# Patient Record
Sex: FEMALE | Race: WHITE | NOT HISPANIC OR LATINO | Employment: OTHER | URBAN - METROPOLITAN AREA
[De-identification: names, ages, dates, MRNs, and addresses within clinical notes are randomized per-mention and may not be internally consistent; named-entity substitution may affect disease eponyms.]

---

## 2019-02-27 ENCOUNTER — APPOINTMENT (EMERGENCY)
Dept: CT IMAGING | Facility: HOSPITAL | Age: 84
End: 2019-02-27
Payer: MEDICARE

## 2019-02-27 ENCOUNTER — HOSPITAL ENCOUNTER (EMERGENCY)
Facility: HOSPITAL | Age: 84
Discharge: HOME | End: 2019-02-27
Attending: EMERGENCY MEDICINE | Admitting: EMERGENCY MEDICINE
Payer: MEDICARE

## 2019-02-27 ENCOUNTER — APPOINTMENT (EMERGENCY)
Dept: RADIOLOGY | Facility: HOSPITAL | Age: 84
End: 2019-02-27
Payer: MEDICARE

## 2019-02-27 VITALS
TEMPERATURE: 98 F | HEART RATE: 55 BPM | RESPIRATION RATE: 14 BRPM | OXYGEN SATURATION: 98 % | DIASTOLIC BLOOD PRESSURE: 86 MMHG | SYSTOLIC BLOOD PRESSURE: 196 MMHG

## 2019-02-27 DIAGNOSIS — R19.01 ABDOMINAL MASS, RUQ (RIGHT UPPER QUADRANT): ICD-10-CM

## 2019-02-27 DIAGNOSIS — S01.01XA SCALP LACERATION, INITIAL ENCOUNTER: ICD-10-CM
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
## 2019-02-27 DIAGNOSIS — S09.90XA CLOSED HEAD INJURY, INITIAL ENCOUNTER: ICD-10-CM

## 2019-02-27 LAB
ANION GAP SERPL CALCULATED.3IONS-SCNC: 7 MMOL/L (ref 4–13)
APTT PPP: 25 SECONDS (ref 26–38)
BASOPHILS # BLD AUTO: 0.01 THOUSANDS/ΜL (ref 0–0.1)
BASOPHILS NFR BLD AUTO: 0 % (ref 0–1)
BUN SERPL-MCNC: 46 MG/DL (ref 5–25)
CALCIUM SERPL-MCNC: 8.7 MG/DL (ref 8.3–10.1)
CHLORIDE SERPL-SCNC: 100 MMOL/L (ref 100–108)
CO2 SERPL-SCNC: 32 MMOL/L (ref 21–32)
CREAT SERPL-MCNC: 2.07 MG/DL (ref 0.6–1.3)
EOSINOPHIL # BLD AUTO: 0.19 THOUSAND/ΜL (ref 0–0.61)
EOSINOPHIL NFR BLD AUTO: 4 % (ref 0–6)
ERYTHROCYTE [DISTWIDTH] IN BLOOD BY AUTOMATED COUNT: 12.4 % (ref 11.6–15.1)
GFR SERPL CREATININE-BSD FRML MDRD: 21 ML/MIN/1.73SQ M
GLUCOSE SERPL-MCNC: 111 MG/DL (ref 65–140)
HCT VFR BLD AUTO: 27.4 % (ref 34.8–46.1)
HGB BLD-MCNC: 8.9 G/DL (ref 11.5–15.4)
IMM GRANULOCYTES # BLD AUTO: 0.01 THOUSAND/UL (ref 0–0.2)
IMM GRANULOCYTES NFR BLD AUTO: 0 % (ref 0–2)
INR PPP: 0.99 (ref 0.86–1.17)
LYMPHOCYTES # BLD AUTO: 1.47 THOUSANDS/ΜL (ref 0.6–4.47)
LYMPHOCYTES NFR BLD AUTO: 28 % (ref 14–44)
MCH RBC QN AUTO: 29.9 PG (ref 26.8–34.3)
MCHC RBC AUTO-ENTMCNC: 32.5 G/DL (ref 31.4–37.4)
MCV RBC AUTO: 92 FL (ref 82–98)
MONOCYTES # BLD AUTO: 0.46 THOUSAND/ΜL (ref 0.17–1.22)
MONOCYTES NFR BLD AUTO: 9 % (ref 4–12)
NEUTROPHILS # BLD AUTO: 3.06 THOUSANDS/ΜL (ref 1.85–7.62)
NEUTS SEG NFR BLD AUTO: 59 % (ref 43–75)
NRBC BLD AUTO-RTO: 0 /100 WBCS
PLATELET # BLD AUTO: 263 THOUSANDS/UL (ref 149–390)
PMV BLD AUTO: 10.2 FL (ref 8.9–12.7)
POTASSIUM SERPL-SCNC: 3.7 MMOL/L (ref 3.5–5.3)
PROTHROMBIN TIME: 12.5 SECONDS (ref 11.8–14.2)
RBC # BLD AUTO: 2.98 MILLION/UL (ref 3.81–5.12)
SODIUM SERPL-SCNC: 139 MMOL/L (ref 136–145)
WBC # BLD AUTO: 5.2 THOUSAND/UL (ref 4.31–10.16)

## 2019-02-27 PROCEDURE — 80048 BASIC METABOLIC PNL TOTAL CA: CPT | Performed by: EMERGENCY MEDICINE

## 2019-02-27 PROCEDURE — 71260 CT THORAX DX C+: CPT

## 2019-02-27 PROCEDURE — 71045 X-RAY EXAM CHEST 1 VIEW: CPT

## 2019-02-27 PROCEDURE — 85610 PROTHROMBIN TIME: CPT | Performed by: EMERGENCY MEDICINE

## 2019-02-27 PROCEDURE — 70450 CT HEAD/BRAIN W/O DYE: CPT

## 2019-02-27 PROCEDURE — 72125 CT NECK SPINE W/O DYE: CPT

## 2019-02-27 PROCEDURE — 74177 CT ABD & PELVIS W/CONTRAST: CPT

## 2019-02-27 PROCEDURE — 99284 EMERGENCY DEPT VISIT MOD MDM: CPT

## 2019-02-27 PROCEDURE — 36415 COLL VENOUS BLD VENIPUNCTURE: CPT | Performed by: EMERGENCY MEDICINE

## 2019-02-27 PROCEDURE — 85730 THROMBOPLASTIN TIME PARTIAL: CPT | Performed by: EMERGENCY MEDICINE

## 2019-02-27 PROCEDURE — 85025 COMPLETE CBC W/AUTO DIFF WBC: CPT | Performed by: EMERGENCY MEDICINE

## 2019-02-27 RX ADMIN — IOHEXOL 90 ML: 350 INJECTION, SOLUTION INTRAVENOUS at 04:31

## 2019-02-27 NOTE — ED PROVIDER NOTES
H&P Exam - Trauma   Apprajinder Li 80 y o  female MRN: [id number]  Unit/Bed#: ED 10/ED 10-01 Encounter: [id number]    Assessment/Plan   Trauma Alert: Trauma Acuity: B  Model of Arrival: Trauma Mode of Arrival: ALS via Trauma Squad Name and Number: keon  Trauma Team: Current Providers  Attending Provider: Michelle Trejo DO  Registered Nurse: Lizzie Beck, RN  Registered Nurse: Preethi Vo RN  Consultants: None    Trauma Active Problems:  Fall with head trauma and pain everywhere, dementia    Trauma Plan:  Labs, imaging, wounds treatment, res    Chief Complaint:   Chief Complaint   Patient presents with    Fall     fall found near bed, +head trauma with 1 in lac to left side of head, unknown LOC, +thinners ASA  pt comes from a dementia unit and is confused  History of Present Illness   HPI:  Carson Santoyo is a 80 y o  female who presents with unwitnessed fall with scalp laceration  Mechanism:Details of Incident: fall unknown LOC, found next to bed, +thinners, +head trauma with a 1in lac to left side of head bleeding controlled at this time  Injury Date: 02/27/19        Medics state they were told this 14-year-old demented female had an unwitnessed fall today  She was found responsive  She told medics she has pain everywhere  She has a laceration of the scalp with no other visible trauma  She is moving all her extremities        Review of Systems   Unable to perform ROS: Dementia       Historical Information     Immunizations: There is no immunization history on file for this patient  Past Medical History:   Diagnosis Date    Dehydration     Dementia     Depression     Hypertension     UTI (urinary tract infection)      History reviewed  No pertinent family history  History reviewed  No pertinent surgical history      Social History     Socioeconomic History    Marital status: Single     Spouse name: None    Number of children: None    Years of education: None    Highest education level: None   Occupational History    None   Social Needs    Financial resource strain: None    Food insecurity:     Worry: None     Inability: None    Transportation needs:     Medical: None     Non-medical: None   Tobacco Use    Smoking status: Never Smoker   Substance and Sexual Activity    Alcohol use: Never     Frequency: Never    Drug use: Never    Sexual activity: None   Lifestyle    Physical activity:     Days per week: None     Minutes per session: None    Stress: None   Relationships    Social connections:     Talks on phone: None     Gets together: None     Attends [employer] service: None     Active member of club or organization: None     Attends meetings of clubs or organizations: None     Relationship status: None    Intimate partner violence:     Fear of current or ex partner: None     Emotionally abused: None     Physically abused: None     Forced sexual activity: None   Other Topics Concern    None   Social History Narrative    None       Family History: non-contributory    Meds/Allergies   None       No Known Allergies    PHYSICAL EXAM    PE limited by:  Dementia    Objective   Vitals:   First set: Temperature: 98 3 °F (36 8 °C) (02/27/19 0345)  Pulse: 72 (02/27/19 0345)  Respirations: 18 (02/27/19 0345)  Blood Pressure: (!) 173/83 (02/27/19 0345)  SpO2: 96 % (02/27/19 0345)    Primary Survey:   (A) Airway:  Normal vocalizations  (B) Breathing:  Symmetric air entry and chest rise  (C) Circulation: Pulses:   normal  (D) Disabliity:  GCS Total:  14  (E) Expose:  Completed    Secondary Survey: (Click on Physical Exam tab above)  Physical Exam   Constitutional: She appears well-developed and well-nourished  No distress  HENT:   Head: Normocephalic  Right Ear: External ear normal    Left Ear: External ear normal    Nose: Nose normal    Mouth/Throat: Oropharynx is clear and moist    One centimeter superficial laceration of left frontal area  No foreign body noted    Bleeding controlled  Eyes: Pupils are equal, round, and reactive to light  Conjunctivae and EOM are normal    Neck: Normal range of motion  Neck supple  No JVD present  Cardiovascular: Normal rate, regular rhythm and intact distal pulses  Murmur heard  Pulmonary/Chest: Effort normal and breath sounds normal  She exhibits no tenderness  Abdominal: Soft  Bowel sounds are normal  She exhibits no distension  There is no tenderness  Musculoskeletal: Normal range of motion  She exhibits no edema, tenderness or deformity  Neurological: She is alert  She has normal reflexes  No cranial nerve deficit  She exhibits normal muscle tone  Coordination normal    Skin: Skin is warm and dry  Capillary refill takes less than 2 seconds  No rash noted  She is not diaphoretic  Psychiatric: She has a normal mood and affect  Nursing note and vitals reviewed        Invasive Devices     Peripheral Intravenous Line            Peripheral IV 02/27/19 Right Arm less than 1 day                Lab Results:   Results Reviewed     Procedure Component Value Units Date/Time    Protime-INR [[id number]]  (Normal) Collected:  02/27/19 0357    Lab Status:  Final result Specimen:  Blood from Arm, Right Updated:  02/27/19 0424     Protime 12 5 seconds      INR 0 99    APTT [[id number]]  (Abnormal) Collected:  02/27/19 0357    Lab Status:  Final result Specimen:  Blood from Arm, Right Updated:  02/27/19 0424     PTT 25 seconds     Basic metabolic panel [[id number]]  (Abnormal) Collected:  02/27/19 0357    Lab Status:  Final result Specimen:  Blood from Arm, Right Updated:  02/27/19 0418     Sodium 139 mmol/L      Potassium 3 7 mmol/L      Chloride 100 mmol/L      CO2 32 mmol/L      ANION GAP 7 mmol/L      BUN 46 mg/dL      Creatinine 2 07 mg/dL      Glucose 111 mg/dL      Calcium 8 7 mg/dL      eGFR 21 ml/min/1 73sq m     Narrative:       National Kidney Disease Education Program recommendations are as follows:  GFR calculation is accurate only with a steady state creatinine  Chronic Kidney disease less than 60 ml/min/1 73 sq  meters  Kidney failure less than 15 ml/min/1 73 sq  meters  CBC and differential [[id number]]  (Abnormal) Collected:  02/27/19 0357    Lab Status:  Final result Specimen:  Blood from Arm, Right Updated:  02/27/19 0407     WBC 5 20 Thousand/uL      RBC 2 98 Million/uL      Hemoglobin 8 9 g/dL      Hematocrit 27 4 %      MCV 92 fL      MCH 29 9 pg      MCHC 32 5 g/dL      RDW 12 4 %      MPV 10 2 fL      Platelets 697 Thousands/uL      nRBC 0 /100 WBCs      Neutrophils Relative 59 %      Immat GRANS % 0 %      Lymphocytes Relative 28 %      Monocytes Relative 9 %      Eosinophils Relative 4 %      Basophils Relative 0 %      Neutrophils Absolute 3 06 Thousands/µL      Immature Grans Absolute 0 01 Thousand/uL      Lymphocytes Absolute 1 47 Thousands/µL      Monocytes Absolute 0 46 Thousand/µL      Eosinophils Absolute 0 19 Thousand/µL      Basophils Absolute 0 01 Thousands/µL                  Imaging Studies:   CT chest abdomen pelvis w contrast   Final Result by Nicole Garcia DO (02/27 8598)      No evidence of acute visceral or vascular injury in the chest, abdomen, or pelvis  Heterogeneous masslike lesion in the right upper quadrant measures approximately 2 9 x 3 9 x 5 2 cm in size, nonspecific but possibly arising from the right adrenal gland  Pheochromocytoma is a consideration  Primary or secondary neoplasm is the    diagnosis of exclusion  Correlation with the patient's symptoms and laboratory values recommended  Additional nonemergent imaging such as MR of the abdomen with and without contrast and/or PET/CT should be considered  Prominence of the pulmonary arteries noted, could be seen with pulmonary arterial hypertension  Correlation with the patient's symptoms recommended  Cardiomegaly, bilateral renal cysts, and other findings as above        Findings discussed with Dr Len Nichole by Dr Timothy Sinha at 5:19 AM on 2/27/2019  Workstation performed: [id number]         CT head without contrast   Final Result by Twan Han DO (02/27 1979)      Large left frontoparietal scalp hematoma but no calvarial fracture or acute intracranial process is seen  Other findings as above  Workstation performed: [id number]         CT cervical spine without contrast   Final Result by Twan Han DO (02/27 8705)      Degenerative changes as described but no evidence of acute cervical spine injury  Other findings as above  Workstation performed: [id number]         XR chest 1 view portable    (Results Pending)       Other Studies:  None    Code Status: No Order  Advance Directive and Living Will:      Power of :    POLST:      Procedures  Lac Repair  Date/Time: 2/27/2019 7:06 AM  Performed by: Ulysses Reiter, DO  Authorized by: Ulysses Reiter, DO   Consent: Verbal consent obtained  Risks and benefits: risks, benefits and alternatives were discussed  Consent given by: patient  Patient understanding: patient states understanding of the procedure being performed  Radiology Images displayed and confirmed  If images not available, report reviewed: imaging studies available  Body area: head/neck  Location details: scalp  Laceration length: 1 cm  Foreign bodies: no foreign bodies  Tendon involvement: none  Nerve involvement: none  Vascular damage: no    Sedation:  Patient sedated: no        Procedure Details:  Irrigation solution: saline  Amount of cleaning: standard  Debridement: none  Degree of undermining: none  Skin closure: glue             Phone Contacts  ED Phone Contact     ED Course         MDM  Number of Diagnoses or Management Options  Abdominal mass, RUQ (right upper quadrant):   Closed head injury, initial encounter:   Fall, initial encounter:   Scalp laceration, initial encounter:   Diagnosis management comments:  Instructions given for PCP follow-up on abdominal mass        Amount and/or Complexity of Data Reviewed  Clinical lab tests: ordered and reviewed  Tests in the radiology section of CPT®: ordered and reviewed  Review and summarize past medical records: yes  Independent visualization of images, tracings, or specimens: yes        Disposition  Final diagnoses:   Fall, initial encounter   Closed head injury, initial encounter   Scalp laceration, initial encounter   Abdominal mass, RUQ (right upper quadrant)     Time reflects when diagnosis was documented in both MDM as applicable and the Disposition within this note     Time User Action Codes Description Comment    2/27/2019  7:41 AM Azalee Rode Add [M62  Steven Mathieu Fall, initial encounter     2/27/2019  7:41 AM Azalee Rode Add [S09 90XA] Closed head injury, initial encounter     2/27/2019  7:41 AM Azalee Rode Add [S01 01XA] Scalp laceration, initial encounter     2/27/2019  7:41 AM Azalee Rode Add [R19 01] Abdominal mass, RUQ (right upper quadrant)       ED Disposition     ED Disposition Condition Date/Time Comment    Discharge Stable Wed Feb 27, 2019  7:41 AM Appoline Foerst discharge to home/self care  Follow-up Information     Follow up With Specialties Details Why Contact Info    Marcelo Razo MD Internal Medicine Call  For wound re-check, head injury and abdominal mass [address] [facility] [address]  [facility]  49  [phone]          Patient's Medications    No medications on file     No discharge procedures on file        ED Provider  Electronically Signed by         Bia Mosher DO  02/27/19 5573